# Patient Record
(demographics unavailable — no encounter records)

---

## 2025-06-18 NOTE — HISTORY OF PRESENT ILLNESS
[FreeTextEntry1] : 67M w/no known pmhx presents to establish cardiac care and for high blood pressure readings at home. He records systolic BPs of 140-160s 2-3x per week, he denies chest pain/pressure/discomfort, HF sxs, syncope, palpitations, or LE swelling.   PMHx: none Ax: NKDA Rx: none SHx: intact IADLs/ADLs, lives in a home, smokes cigars occasionally, drinks 4-5 glasses of wine for 5d/week, no use of recreational drugs FOX on moderate-severe exertion FHx: father passed at 90 had diabetes, mother  of renal CA in late 60s, 2 brothers and 1 sisters with no medical issues

## 2025-06-18 NOTE — DISCUSSION/SUMMARY
[EKG obtained to assist in diagnosis and management of assessed problem(s)] : EKG obtained to assist in diagnosis and management of assessed problem(s) [FreeTextEntry1] : ECG: Sinus Bradycardia HR 57bpm  1. HTN- provided BP log for 2x per day, if persistently hypertensive via BP log will initiate CCB vs. ACEI/ARB on next visit, encouraged lifestyle modifications including diet and moderate exercise 150 minutes per week, advised to reduced ETOH intake (currently 3-4 glasses of wine per week), f/u TSH, a1c, lipid panel, and TTE to assess for end organ damage 2. Labs today, return in 1 month after TTE 3. FOX - CCTA